# Patient Record
Sex: MALE | Race: BLACK OR AFRICAN AMERICAN | NOT HISPANIC OR LATINO | Employment: OTHER | URBAN - METROPOLITAN AREA
[De-identification: names, ages, dates, MRNs, and addresses within clinical notes are randomized per-mention and may not be internally consistent; named-entity substitution may affect disease eponyms.]

---

## 2018-03-24 ENCOUNTER — HOSPITAL ENCOUNTER (INPATIENT)
Facility: OTHER | Age: 83
LOS: 1 days | Discharge: HOME | DRG: 871 | End: 2018-03-26
Attending: EMERGENCY MEDICINE | Admitting: EMERGENCY MEDICINE
Payer: MEDICARE

## 2018-03-24 DIAGNOSIS — J98.4 PNEUMONITIS: Primary | ICD-10-CM

## 2018-03-24 DIAGNOSIS — R50.9 FEBRILE: ICD-10-CM

## 2018-03-24 DIAGNOSIS — N30.00 ACUTE CYSTITIS WITHOUT HEMATURIA: ICD-10-CM

## 2018-03-24 LAB
ALBUMIN SERPL BCP-MCNC: 4 G/DL
ALP SERPL-CCNC: 59 U/L
ALT SERPL W/O P-5'-P-CCNC: 29 U/L
ANION GAP SERPL CALC-SCNC: 15 MMOL/L
AST SERPL-CCNC: 29 U/L
BASOPHILS # BLD AUTO: 0.01 K/UL
BASOPHILS NFR BLD: 0.1 %
BILIRUB SERPL-MCNC: 0.3 MG/DL
BUN SERPL-MCNC: 18 MG/DL
CALCIUM SERPL-MCNC: 9.2 MG/DL
CHLORIDE SERPL-SCNC: 111 MMOL/L
CO2 SERPL-SCNC: 20 MMOL/L
CREAT SERPL-MCNC: 1 MG/DL
DIFFERENTIAL METHOD: ABNORMAL
EOSINOPHIL # BLD AUTO: 0.1 K/UL
EOSINOPHIL NFR BLD: 0.6 %
ERYTHROCYTE [DISTWIDTH] IN BLOOD BY AUTOMATED COUNT: 16.1 %
EST. GFR  (AFRICAN AMERICAN): >60 ML/MIN/1.73 M^2
EST. GFR  (NON AFRICAN AMERICAN): >60 ML/MIN/1.73 M^2
GLUCOSE SERPL-MCNC: 118 MG/DL
HCT VFR BLD AUTO: 37.7 %
HGB BLD-MCNC: 12.1 G/DL
LACTATE SERPL-SCNC: 2.2 MMOL/L
LYMPHOCYTES # BLD AUTO: 1 K/UL
LYMPHOCYTES NFR BLD: 9.2 %
MCH RBC QN AUTO: 28.6 PG
MCHC RBC AUTO-ENTMCNC: 32.1 G/DL
MCV RBC AUTO: 89 FL
MONOCYTES # BLD AUTO: 0.2 K/UL
MONOCYTES NFR BLD: 1.8 %
NEUTROPHILS # BLD AUTO: 9.4 K/UL
NEUTROPHILS NFR BLD: 88.1 %
PLATELET # BLD AUTO: 172 K/UL
PMV BLD AUTO: 12.1 FL
POTASSIUM SERPL-SCNC: 3.8 MMOL/L
PROT SERPL-MCNC: 7.9 G/DL
RBC # BLD AUTO: 4.23 M/UL
SODIUM SERPL-SCNC: 146 MMOL/L
WBC # BLD AUTO: 10.68 K/UL

## 2018-03-24 PROCEDURE — 85025 COMPLETE CBC W/AUTO DIFF WBC: CPT

## 2018-03-24 PROCEDURE — 25000003 PHARM REV CODE 250: Performed by: EMERGENCY MEDICINE

## 2018-03-24 PROCEDURE — 63600175 PHARM REV CODE 636 W HCPCS: Performed by: EMERGENCY MEDICINE

## 2018-03-24 PROCEDURE — 80053 COMPREHEN METABOLIC PANEL: CPT

## 2018-03-24 PROCEDURE — 99291 CRITICAL CARE FIRST HOUR: CPT | Mod: 25

## 2018-03-24 PROCEDURE — 96361 HYDRATE IV INFUSION ADD-ON: CPT

## 2018-03-24 PROCEDURE — 87077 CULTURE AEROBIC IDENTIFY: CPT

## 2018-03-24 PROCEDURE — 87040 BLOOD CULTURE FOR BACTERIA: CPT | Mod: 59

## 2018-03-24 PROCEDURE — 93005 ELECTROCARDIOGRAM TRACING: CPT

## 2018-03-24 PROCEDURE — 93010 ELECTROCARDIOGRAM REPORT: CPT | Mod: ,,, | Performed by: INTERNAL MEDICINE

## 2018-03-24 PROCEDURE — 87186 SC STD MICRODIL/AGAR DIL: CPT

## 2018-03-24 PROCEDURE — 96365 THER/PROPH/DIAG IV INF INIT: CPT

## 2018-03-24 PROCEDURE — 83605 ASSAY OF LACTIC ACID: CPT

## 2018-03-24 PROCEDURE — 96375 TX/PRO/DX INJ NEW DRUG ADDON: CPT

## 2018-03-24 RX ORDER — ACETAMINOPHEN 500 MG
1000 TABLET ORAL
Status: COMPLETED | OUTPATIENT
Start: 2018-03-24 | End: 2018-03-24

## 2018-03-24 RX ORDER — FINASTERIDE 5 MG/1
5 TABLET, FILM COATED ORAL DAILY
COMMUNITY

## 2018-03-24 RX ADMIN — ACETAMINOPHEN 1000 MG: 500 TABLET ORAL at 09:03

## 2018-03-24 RX ADMIN — AZITHROMYCIN MONOHYDRATE 500 MG: 500 INJECTION, POWDER, LYOPHILIZED, FOR SOLUTION INTRAVENOUS at 11:03

## 2018-03-24 RX ADMIN — CEFTRIAXONE 2 G: 2 INJECTION, SOLUTION INTRAVENOUS at 09:03

## 2018-03-24 RX ADMIN — SODIUM CHLORIDE 2994 ML: 0.9 INJECTION, SOLUTION INTRAVENOUS at 09:03

## 2018-03-25 PROBLEM — J98.4 PNEUMONITIS: Status: ACTIVE | Noted: 2018-03-25

## 2018-03-25 PROBLEM — A41.9 SEPSIS: Status: RESOLVED | Noted: 2018-03-25 | Resolved: 2018-03-25

## 2018-03-25 PROBLEM — I10 ESSENTIAL HYPERTENSION: Status: ACTIVE | Noted: 2018-03-25

## 2018-03-25 PROBLEM — A41.9 SEPSIS: Status: ACTIVE | Noted: 2018-03-25

## 2018-03-25 LAB
BACTERIA #/AREA URNS HPF: ABNORMAL /HPF
BASOPHILS # BLD AUTO: 0.02 K/UL
BASOPHILS NFR BLD: 0.2 %
BILIRUB UR QL STRIP: NEGATIVE
CLARITY UR: ABNORMAL
COLOR UR: YELLOW
DIFFERENTIAL METHOD: ABNORMAL
EOSINOPHIL # BLD AUTO: 0 K/UL
EOSINOPHIL NFR BLD: 0.1 %
ERYTHROCYTE [DISTWIDTH] IN BLOOD BY AUTOMATED COUNT: 16.3 %
FLUAV AG SPEC QL IA: NEGATIVE
FLUBV AG SPEC QL IA: NEGATIVE
GLUCOSE UR QL STRIP: NEGATIVE
HCT VFR BLD AUTO: 35.4 %
HGB BLD-MCNC: 11.5 G/DL
HGB UR QL STRIP: ABNORMAL
HYALINE CASTS #/AREA URNS LPF: 0 /LPF
KETONES UR QL STRIP: NEGATIVE
LACTATE SERPL-SCNC: 0.7 MMOL/L
LEUKOCYTE ESTERASE UR QL STRIP: ABNORMAL
LYMPHOCYTES # BLD AUTO: 1.7 K/UL
LYMPHOCYTES NFR BLD: 14.1 %
MCH RBC QN AUTO: 29.1 PG
MCHC RBC AUTO-ENTMCNC: 32.5 G/DL
MCV RBC AUTO: 90 FL
MICROSCOPIC COMMENT: ABNORMAL
MONOCYTES # BLD AUTO: 0.9 K/UL
MONOCYTES NFR BLD: 6.9 %
NEUTROPHILS # BLD AUTO: 9.7 K/UL
NEUTROPHILS NFR BLD: 78.4 %
NITRITE UR QL STRIP: POSITIVE
PH UR STRIP: 7 [PH] (ref 5–8)
PLATELET # BLD AUTO: 157 K/UL
PMV BLD AUTO: 12 FL
PROT UR QL STRIP: ABNORMAL
RBC # BLD AUTO: 3.95 M/UL
RBC #/AREA URNS HPF: 2 /HPF (ref 0–4)
SP GR UR STRIP: 1.01 (ref 1–1.03)
SPECIMEN SOURCE: NORMAL
SQUAMOUS #/AREA URNS HPF: 0 /HPF
URN SPEC COLLECT METH UR: ABNORMAL
UROBILINOGEN UR STRIP-ACNC: 1 EU/DL
WBC # BLD AUTO: 12.35 K/UL
WBC #/AREA URNS HPF: 8 /HPF (ref 0–5)

## 2018-03-25 PROCEDURE — 63600175 PHARM REV CODE 636 W HCPCS: Performed by: PHYSICIAN ASSISTANT

## 2018-03-25 PROCEDURE — 81000 URINALYSIS NONAUTO W/SCOPE: CPT

## 2018-03-25 PROCEDURE — 87086 URINE CULTURE/COLONY COUNT: CPT

## 2018-03-25 PROCEDURE — 25000003 PHARM REV CODE 250: Performed by: PHYSICIAN ASSISTANT

## 2018-03-25 PROCEDURE — 36415 COLL VENOUS BLD VENIPUNCTURE: CPT

## 2018-03-25 PROCEDURE — 63600175 PHARM REV CODE 636 W HCPCS: Performed by: EMERGENCY MEDICINE

## 2018-03-25 PROCEDURE — 25000003 PHARM REV CODE 250: Performed by: EMERGENCY MEDICINE

## 2018-03-25 PROCEDURE — 94761 N-INVAS EAR/PLS OXIMETRY MLT: CPT

## 2018-03-25 PROCEDURE — 27000221 HC OXYGEN, UP TO 24 HOURS

## 2018-03-25 PROCEDURE — 87400 INFLUENZA A/B EACH AG IA: CPT

## 2018-03-25 PROCEDURE — 11000001 HC ACUTE MED/SURG PRIVATE ROOM

## 2018-03-25 PROCEDURE — 94799 UNLISTED PULMONARY SVC/PX: CPT

## 2018-03-25 PROCEDURE — 85025 COMPLETE CBC W/AUTO DIFF WBC: CPT

## 2018-03-25 PROCEDURE — 83605 ASSAY OF LACTIC ACID: CPT

## 2018-03-25 PROCEDURE — 99223 1ST HOSP IP/OBS HIGH 75: CPT | Mod: ,,, | Performed by: INTERNAL MEDICINE

## 2018-03-25 RX ORDER — DOXAZOSIN 2 MG/1
4 TABLET ORAL DAILY
Status: DISCONTINUED | OUTPATIENT
Start: 2018-03-25 | End: 2018-03-26 | Stop reason: HOSPADM

## 2018-03-25 RX ORDER — ONDANSETRON 8 MG/1
8 TABLET, ORALLY DISINTEGRATING ORAL EVERY 8 HOURS PRN
Status: DISCONTINUED | OUTPATIENT
Start: 2018-03-25 | End: 2018-03-26 | Stop reason: HOSPADM

## 2018-03-25 RX ORDER — GUAIFENESIN 100 MG/5ML
200 SOLUTION ORAL EVERY 4 HOURS PRN
Status: DISCONTINUED | OUTPATIENT
Start: 2018-03-25 | End: 2018-03-26 | Stop reason: HOSPADM

## 2018-03-25 RX ORDER — HYDRALAZINE HYDROCHLORIDE 25 MG/1
25 TABLET, FILM COATED ORAL EVERY 8 HOURS PRN
Status: DISCONTINUED | OUTPATIENT
Start: 2018-03-25 | End: 2018-03-26 | Stop reason: HOSPADM

## 2018-03-25 RX ORDER — FINASTERIDE 5 MG/1
5 TABLET, FILM COATED ORAL DAILY
Status: DISCONTINUED | OUTPATIENT
Start: 2018-03-26 | End: 2018-03-26 | Stop reason: HOSPADM

## 2018-03-25 RX ORDER — TRIAMTERENE AND HYDROCHLOROTHIAZIDE 37.5; 25 MG/1; MG/1
1 CAPSULE ORAL DAILY
Status: DISCONTINUED | OUTPATIENT
Start: 2018-03-25 | End: 2018-03-26 | Stop reason: HOSPADM

## 2018-03-25 RX ORDER — HEPARIN SODIUM 5000 [USP'U]/ML
5000 INJECTION, SOLUTION INTRAVENOUS; SUBCUTANEOUS EVERY 8 HOURS
Status: DISCONTINUED | OUTPATIENT
Start: 2018-03-25 | End: 2018-03-25

## 2018-03-25 RX ORDER — SODIUM CHLORIDE 9 MG/ML
INJECTION, SOLUTION INTRAVENOUS CONTINUOUS
Status: DISCONTINUED | OUTPATIENT
Start: 2018-03-25 | End: 2018-03-25

## 2018-03-25 RX ORDER — ACETAMINOPHEN 325 MG/1
650 TABLET ORAL EVERY 4 HOURS PRN
Status: DISCONTINUED | OUTPATIENT
Start: 2018-03-25 | End: 2018-03-26 | Stop reason: HOSPADM

## 2018-03-25 RX ORDER — RAMELTEON 8 MG/1
8 TABLET ORAL NIGHTLY PRN
Status: DISCONTINUED | OUTPATIENT
Start: 2018-03-25 | End: 2018-03-26 | Stop reason: HOSPADM

## 2018-03-25 RX ORDER — SODIUM CHLORIDE 0.9 % (FLUSH) 0.9 %
3 SYRINGE (ML) INJECTION
Status: DISCONTINUED | OUTPATIENT
Start: 2018-03-25 | End: 2018-03-26 | Stop reason: HOSPADM

## 2018-03-25 RX ORDER — ENOXAPARIN SODIUM 100 MG/ML
40 INJECTION SUBCUTANEOUS EVERY 24 HOURS
Status: DISCONTINUED | OUTPATIENT
Start: 2018-03-25 | End: 2018-03-26 | Stop reason: HOSPADM

## 2018-03-25 RX ADMIN — SODIUM CHLORIDE: 0.9 INJECTION, SOLUTION INTRAVENOUS at 11:03

## 2018-03-25 RX ADMIN — SODIUM CHLORIDE: 0.9 INJECTION, SOLUTION INTRAVENOUS at 04:03

## 2018-03-25 RX ADMIN — AZITHROMYCIN MONOHYDRATE 500 MG: 500 INJECTION, POWDER, LYOPHILIZED, FOR SOLUTION INTRAVENOUS at 10:03

## 2018-03-25 RX ADMIN — VANCOMYCIN HYDROCHLORIDE 1500 MG: 1 INJECTION, POWDER, LYOPHILIZED, FOR SOLUTION INTRAVENOUS at 12:03

## 2018-03-25 RX ADMIN — VANCOMYCIN HYDROCHLORIDE 2000 MG: 1 INJECTION, POWDER, LYOPHILIZED, FOR SOLUTION INTRAVENOUS at 12:03

## 2018-03-25 RX ADMIN — ENOXAPARIN SODIUM 40 MG: 100 INJECTION SUBCUTANEOUS at 04:03

## 2018-03-25 RX ADMIN — TRIAMTERENE AND HYDROCHLOROTHIAZIDE 1 CAPSULE: 25; 37.5 CAPSULE ORAL at 08:03

## 2018-03-25 RX ADMIN — ACETAMINOPHEN 650 MG: 325 TABLET ORAL at 11:03

## 2018-03-25 RX ADMIN — DOXAZOSIN MESYLATE 4 MG: 2 TABLET ORAL at 08:03

## 2018-03-25 NOTE — SUBJECTIVE & OBJECTIVE
Past Medical History:   Diagnosis Date    Arthritis     Hypertension        Past Surgical History:   Procedure Laterality Date    JOINT REPLACEMENT         Review of patient's allergies indicates:   Allergen Reactions    Amlodipine Swelling     Pt stated that he has an reaction 20 years ago       No current facility-administered medications on file prior to encounter.      No current outpatient prescriptions on file prior to encounter.     Family History     None        Social History Main Topics    Smoking status: Former Smoker    Smokeless tobacco: Not on file    Alcohol use No    Drug use: No    Sexual activity: Not on file     Review of Systems   Constitutional: Negative for activity change, appetite change, chills, diaphoresis and fever.   HENT: Negative for congestion, ear pain, rhinorrhea and sore throat.    Respiratory: Negative for cough, shortness of breath and wheezing.    Cardiovascular: Negative for chest pain, palpitations and leg swelling.   Gastrointestinal: Negative for abdominal pain, constipation, diarrhea, nausea and vomiting.   Genitourinary: Negative for decreased urine volume, difficulty urinating, frequency, hematuria and urgency.   Musculoskeletal: Negative for arthralgias, back pain, myalgias and neck pain.   Skin: Negative for color change, pallor and rash.   Neurological: Positive for tremors. Negative for dizziness, seizures, syncope, weakness, numbness and headaches.   Psychiatric/Behavioral: Negative for confusion and decreased concentration.     Objective:     Vital Signs (Most Recent):  Temp: 98.4 °F (36.9 °C) (03/24/18 2314)  Pulse: 70 (03/25/18 0140)  Resp: 16 (03/24/18 2116)  BP: 130/73 (03/25/18 0140)  SpO2: 96 % (03/25/18 0147) Vital Signs (24h Range):  Temp:  [98.4 °F (36.9 °C)-103.1 °F (39.5 °C)] 98.4 °F (36.9 °C)  Pulse:  [] 70  Resp:  [16] 16  SpO2:  [89 %-96 %] 96 %  BP: (115-159)/(59-83) 130/73     Weight: 99.8 kg (220 lb)  Body mass index is 27.5  kg/m².    Physical Exam   Constitutional: He is oriented to person, place, and time. Vital signs are normal. He appears well-developed and well-nourished.  Non-toxic appearance. He does not have a sickly appearance. He does not appear ill. No distress.   HENT:   Head: Normocephalic and atraumatic.   Right Ear: External ear normal.   Left Ear: External ear normal.   Eyes: Conjunctivae and EOM are normal. Pupils are equal, round, and reactive to light. No scleral icterus.   Neck: Normal range of motion. Neck supple. No JVD present. No tracheal deviation present.   Cardiovascular: Normal rate, regular rhythm, normal heart sounds and intact distal pulses.  Exam reveals no gallop and no friction rub.    No murmur heard.  2+ distal radial/DT/PT pulses. No carotid bruits.     Pulmonary/Chest: Effort normal and breath sounds normal. No stridor. No respiratory distress. He has no wheezes. He has no rales.   Abdominal: Soft. Bowel sounds are normal. He exhibits no distension and no mass. There is no tenderness. There is no guarding.   Neurological: He is alert and oriented to person, place, and time.   Skin: Skin is warm and dry. Capillary refill takes less than 2 seconds. He is not diaphoretic.   Psychiatric: He has a normal mood and affect. His behavior is normal. Judgment and thought content normal.   Nursing note and vitals reviewed.        CRANIAL NERVES     CN III, IV, VI   Pupils are equal, round, and reactive to light.  Extraocular motions are normal.        Significant Labs:   BMP:   Recent Labs  Lab 03/24/18  2139   *   *   K 3.8   *   CO2 20*   BUN 18   CREATININE 1.0   CALCIUM 9.2     CBC:   Recent Labs  Lab 03/24/18 2139   WBC 10.68   HGB 12.1*   HCT 37.7*        CMP:   Recent Labs  Lab 03/24/18  2139   *   K 3.8   *   CO2 20*   *   BUN 18   CREATININE 1.0   CALCIUM 9.2   PROT 7.9   ALBUMIN 4.0   BILITOT 0.3   ALKPHOS 59   AST 29   ALT 29   ANIONGAP 15   EGFRNONAA >60      All pertinent labs within the past 24 hours have been reviewed.    Significant Imaging: I have reviewed all pertinent imaging results/findings within the past 24 hours.   Imaging Results          X-Ray Chest AP Portable (Final result)  Result time 03/24/18 23:57:34    Final result by Gray Sanford MD (03/24/18 23:57:34)                 Impression:      Increased attenuation of the pulmonary interstitium.  The findings may represent early pulmonary edema or nonspecific pneumonitis.  No focal consolidation.      Electronically signed by: Gray Sanford MD  Date:    03/24/2018  Time:    23:57             Narrative:    EXAMINATION:  XR CHEST AP PORTABLE    CLINICAL HISTORY:  Sepsis;    TECHNIQUE:  Single frontal view of the chest was performed.    COMPARISON:  None    FINDINGS:  Monitoring EKG leads are present.  The trachea is unremarkable.  There are calcifications of the aortic knob.  The cardiac silhouette is at upper limits of normal.  There is mild elevation of the right hemidiaphragm.  The left hemidiaphragm is unremarkable.  There is no evidence of free air beneath the hemidiaphragms.  There are no pleural effusions.  There is no evidence of a pneumothorax.  There is no evidence of pneumomediastinum.  There is increased attenuation of the pulmonary interstitium.  No focal consolidation is present.  There are degenerative changes in the osseous structures.

## 2018-03-25 NOTE — PLAN OF CARE
Met with patient at bedside to complete discharge planning assessment. Patient lives in [place] but is here on an extended vacation to visit his children. Plans to return to [place] Friday 3/30 via car. Patient's PCP is Dr Welch in [place] & his pharmacy of choice while here is [facility]. Patient is independent of ADLs & denies any anticipated DC needs      03/25/18 0903   Discharge Assessment   Assessment Type Discharge Planning Assessment   Confirmed/corrected address and phone number on facesheet? Yes   Assessment information obtained from? Patient   Communicated expected length of stay with patient/caregiver no   Prior to hospitilization cognitive status: Alert/Oriented   Prior to hospitalization functional status: Independent   Current cognitive status: Alert/Oriented   Current Functional Status: Independent   Lives With child(adolfo), adult   Able to Return to Prior Arrangements yes   Is patient able to care for self after discharge? Yes   Patient's perception of discharge disposition home or selfcare   Readmission Within The Last 30 Days no previous admission in last 30 days   Patient currently being followed by outpatient case management? No   Patient currently receives any other outside agency services? No   Equipment Currently Used at Home none   Do you have any problems affording any of your prescribed medications? No   Is the patient taking medications as prescribed? yes   Does the patient have transportation home? Yes   Transportation Available family or friend will provide   Does the patient receive services at the [facility]? No   Discharge Plan A Home with family   Patient/Family In Agreement With Plan yes

## 2018-03-25 NOTE — HPI
Mr. Jonathan Flannery Jr. is a 82 y.o. Male, with a history HTN, who presents with complaint of lightheadedness that began about two hours ago. He reports feeling well earlier in the day. He reports fever that developed about two hours ago. He reports chronic productive cough with no recent changes. He denies congestion, sore throat, shortness of breath, abdominal pain, dysuria, or urinary frequency. He denies any known sick contacts. He denies tobacco use.     The patient was evaluated in the ED and diagnosed with pneumonitis via CXR. He did meet sepsis criteria, and was admitted for IV antibiotics and close monitoring in the ICU.

## 2018-03-25 NOTE — PLAN OF CARE
Problem: Patient Care Overview  Goal: Plan of Care Review  Outcome: Ongoing (interventions implemented as appropriate)  Pt in chair for most of day. Afebrile. VSS. Transferred to  309 with family at chairside. C/o of should pain x1 early this am. Relieved with PO tylenol. Uneventful day.

## 2018-03-25 NOTE — ED PROVIDER NOTES
Encounter Date: 3/24/2018    SCRIBE #1 NOTE: I, Yarelis Soriano, am scribing for, and in the presence of, Dr. Mendieta.       History     Chief Complaint   Patient presents with    Dizziness     Time seen by provider: 9:47 PM    This is a 82 y.o. Male, with a history HTN, who presents with complaint of lightheadedness that began about two hours ago. He reports feeling well earlier in the day. He reports fever that developed about two hours ago. He reports chronic productive cough with no recent changes. He denies congestion, sore throat, shortness of breath, abdominal pain, dysuria, or urinary frequency. He denies any known sick contacts. He denies tobacco use.       The history is provided by the patient.     Review of patient's allergies indicates:  No Known Allergies  Past Medical History:   Diagnosis Date    Arthritis     Hypertension      Past Surgical History:   Procedure Laterality Date    JOINT REPLACEMENT       History reviewed. No pertinent family history.  Social History   Substance Use Topics    Smoking status: Former Smoker    Smokeless tobacco: Not on file    Alcohol use No     Review of Systems   Constitutional: Positive for fever.   HENT: Negative for congestion and sore throat.    Respiratory: Positive for cough (chronic). Negative for shortness of breath.    Cardiovascular: Negative for chest pain.   Gastrointestinal: Negative for abdominal pain, nausea and vomiting.   Genitourinary: Negative for dysuria, frequency and urgency.   Musculoskeletal: Negative for back pain.   Skin: Negative for rash.   Neurological: Positive for light-headedness. Negative for weakness.   Hematological: Does not bruise/bleed easily.       Physical Exam     Initial Vitals [03/24/18 2116]   BP Pulse Resp Temp SpO2   (!) 159/69 (!) 123 16 (!) 103.1 °F (39.5 °C) (!) 89 %      MAP       99         Physical Exam    Nursing note and vitals reviewed.  Constitutional: He appears well-developed and well-nourished. He is not  diaphoretic. No distress.   HENT:   Head: Normocephalic and atraumatic.   Right Ear: External ear normal.   Left Ear: External ear normal.   Eyes: EOM are normal. Right eye exhibits no discharge. Left eye exhibits no discharge.   Neck: Normal range of motion.   Cardiovascular: Regular rhythm and normal heart sounds. Tachycardia present.  Exam reveals no gallop and no friction rub.    No murmur heard.  2+ distal radius pulses.    Pulmonary/Chest: Breath sounds normal. No respiratory distress. He has no wheezes. He has no rhonchi. He has no rales.   Abdominal: Soft. There is no tenderness. There is no rebound and no guarding.   Musculoskeletal: Normal range of motion. He exhibits no edema or tenderness.   Neurological: He is alert and oriented to person, place, and time.   Skin: Skin is warm and dry. No rash and no abscess noted. No erythema. No pallor.   Skin warm to touch. No rash or lesions.    Psychiatric: He has a normal mood and affect. His behavior is normal. Judgment and thought content normal.         ED Course   Critical Care  Date/Time: 3/25/2018 4:17 AM  Performed by: TRISH VALDEZ.  Authorized by: TRISH VALDEZ   Direct patient critical care time: 20 minutes  Additional history critical care time: 5 minutes  Ordering / reviewing critical care time: 5 minutes  Documentation critical care time: 5 minutes  Other critical care time: 5 minutes  Total critical care time (exclusive of procedural time) : 40 minutes  Critical care time was exclusive of separately billable procedures and treating other patients and teaching time.  Critical care was necessary to treat or prevent imminent or life-threatening deterioration of the following conditions: sepsis.        Labs Reviewed   CBC W/ AUTO DIFFERENTIAL - Abnormal; Notable for the following:        Result Value    RBC 4.23 (*)     Hemoglobin 12.1 (*)     Hematocrit 37.7 (*)     RDW 16.1 (*)     Gran # (ANC) 9.4 (*)     Mono # 0.2 (*)     Gran% 88.1 (*)      Lymph% 9.2 (*)     Mono% 1.8 (*)     All other components within normal limits   COMPREHENSIVE METABOLIC PANEL - Abnormal; Notable for the following:     Sodium 146 (*)     Chloride 111 (*)     CO2 20 (*)     Glucose 118 (*)     All other components within normal limits   CULTURE, BLOOD   CULTURE, BLOOD   CULTURE, URINE   LACTIC ACID, PLASMA   INFLUENZA A AND B ANTIGEN   URINALYSIS     Imaging Results          X-Ray Chest AP Portable (Final result)  Result time 03/24/18 23:57:34    Final result by Gray Sanford MD (03/24/18 23:57:34)                 Impression:      Increased attenuation of the pulmonary interstitium.  The findings may represent early pulmonary edema or nonspecific pneumonitis.  No focal consolidation.      Electronically signed by: Gray Sanford MD  Date:    03/24/2018  Time:    23:57             Narrative:    EXAMINATION:  XR CHEST AP PORTABLE    CLINICAL HISTORY:  Sepsis;    TECHNIQUE:  Single frontal view of the chest was performed.    COMPARISON:  None    FINDINGS:  Monitoring EKG leads are present.  The trachea is unremarkable.  There are calcifications of the aortic knob.  The cardiac silhouette is at upper limits of normal.  There is mild elevation of the right hemidiaphragm.  The left hemidiaphragm is unremarkable.  There is no evidence of free air beneath the hemidiaphragms.  There are no pleural effusions.  There is no evidence of a pneumothorax.  There is no evidence of pneumomediastinum.  There is increased attenuation of the pulmonary interstitium.  No focal consolidation is present.  There are degenerative changes in the osseous structures.                                     X-Rays:   Independently Interpreted Readings:   Chest X-Ray: Chronic changes. No definite opacities.        Medical Decision Making:   Independently Interpreted Test(s):   I have ordered and independently interpreted X-rays - see prior notes.  I have ordered and independently interpreted EKG Reading(s) - see prior  "notes  Clinical Tests:   Lab Tests: Ordered and Reviewed  Radiological Study: Ordered and Reviewed  Medical Tests: Ordered and Reviewed  ED Management:  Elderly gentleman presents with vital signs concerning for sepsis.  He is febrile tachycardic and hypoxic.  His only symptom is "shakes ".  This started just tonight.  Usual state of health earlier today.  Surprisingly healthy for his age, only hypertension and BPH.  X-ray reveals nonspecific pneumonitis no eyes pneumonia.  I have begun antibiotics per the sepsis protocol.  Lactate is been borderline at 2.2.  Repeat lactate much improved at 0.7.  No leukocytosis.  Given his aunts age and concerning presentation, I do think he warrants overnight hospitalization.  Per our hospital protocol for sepsis so we placed in ICU.  At time of admission his urinalysis does return, concerning for infection nitrate positive.  The pneumonia antibiotics and with the ceftriaxone should be adequate for this.              Attending Attestation:           Physician Attestation for Scribe:  Physician Attestation Statement for Scribe #1: I, Dr. Mendieta, reviewed documentation, as scribed by Yarelis Soriano in my presence, and it is both accurate and complete.                    Clinical Impression:     1. Pneumonitis    2. Febrile    3. Acute cystitis without hematuria                                 Ha Mendieta MD  03/25/18 0417    "

## 2018-03-25 NOTE — H&P
[facility] Hospital Medicine  History & Physical    Patient Name: Jonathan Flannery Jr.  MRN: [id number]  Admission Date: 3/24/2018  Attending Physician: Sangeetha Knott, *   Primary Care Provider: No primary care provider on file.         Patient information was obtained from patient, past medical records and ER records.     Subjective:     Principal Problem:Sepsis    Chief Complaint:   Chief Complaint   Patient presents with    Dizziness        HPI: Mr. Jonathan Flannery Jr. is a 82 y.o. Male, with a history HTN, who presents with complaint of lightheadedness that began about two hours ago. He reports feeling well earlier in the day. He reports fever that developed about two hours ago. He reports chronic productive cough with no recent changes. He denies congestion, sore throat, shortness of breath, abdominal pain, dysuria, or urinary frequency. He denies any known sick contacts. He denies tobacco use.     The patient was evaluated in the ED and diagnosed with pneumonitis via CXR. He did meet sepsis criteria, and was admitted for IV antibiotics and close monitoring in the ICU.     Past Medical History:   Diagnosis Date    Arthritis     Hypertension        Past Surgical History:   Procedure Laterality Date    JOINT REPLACEMENT         Review of patient's allergies indicates:   Allergen Reactions    Amlodipine Swelling     Pt stated that he has an reaction 20 years ago       No current facility-administered medications on file prior to encounter.      No current outpatient prescriptions on file prior to encounter.     Family History     None        Social History Main Topics    Smoking status: Former Smoker    Smokeless tobacco: Not on file    Alcohol use No    Drug use: No    Sexual activity: Not on file     Review of Systems   Constitutional: Negative for activity change, appetite change, chills, diaphoresis and fever.   HENT: Negative for congestion, ear pain, rhinorrhea and sore  throat.    Respiratory: Negative for cough, shortness of breath and wheezing.    Cardiovascular: Negative for chest pain, palpitations and leg swelling.   Gastrointestinal: Negative for abdominal pain, constipation, diarrhea, nausea and vomiting.   Genitourinary: Negative for decreased urine volume, difficulty urinating, frequency, hematuria and urgency.   Musculoskeletal: Negative for arthralgias, back pain, myalgias and neck pain.   Skin: Negative for color change, pallor and rash.   Neurological: Positive for tremors. Negative for dizziness, seizures, syncope, weakness, numbness and headaches.   Psychiatric/Behavioral: Negative for confusion and decreased concentration.     Objective:     Vital Signs (Most Recent):  Temp: 98.4 °F (36.9 °C) (03/24/18 2314)  Pulse: 70 (03/25/18 0140)  Resp: 16 (03/24/18 2116)  BP: 130/73 (03/25/18 0140)  SpO2: 96 % (03/25/18 0147) Vital Signs (24h Range):  Temp:  [98.4 °F (36.9 °C)-103.1 °F (39.5 °C)] 98.4 °F (36.9 °C)  Pulse:  [] 70  Resp:  [16] 16  SpO2:  [89 %-96 %] 96 %  BP: (115-159)/(59-83) 130/73     Weight: 99.8 kg (220 lb)  Body mass index is 27.5 kg/m².    Physical Exam   Constitutional: He is oriented to person, place, and time. Vital signs are normal. He appears well-developed and well-nourished.  Non-toxic appearance. He does not have a sickly appearance. He does not appear ill. No distress.   HENT:   Head: Normocephalic and atraumatic.   Right Ear: External ear normal.   Left Ear: External ear normal.   Eyes: Conjunctivae and EOM are normal. Pupils are equal, round, and reactive to light. No scleral icterus.   Neck: Normal range of motion. Neck supple. No JVD present. No tracheal deviation present.   Cardiovascular: Normal rate, regular rhythm, normal heart sounds and intact distal pulses.  Exam reveals no gallop and no friction rub.    No murmur heard.  2+ distal radial/DT/PT pulses. No carotid bruits.     Pulmonary/Chest: Effort normal and breath sounds  normal. No stridor. No respiratory distress. He has no wheezes. He has no rales.   Abdominal: Soft. Bowel sounds are normal. He exhibits no distension and no mass. There is no tenderness. There is no guarding.   Neurological: He is alert and oriented to person, place, and time.   Skin: Skin is warm and dry. Capillary refill takes less than 2 seconds. He is not diaphoretic.   Psychiatric: He has a normal mood and affect. His behavior is normal. Judgment and thought content normal.   Nursing note and vitals reviewed.        CRANIAL NERVES     CN III, IV, VI   Pupils are equal, round, and reactive to light.  Extraocular motions are normal.        Significant Labs:   BMP:   Recent Labs  Lab 03/24/18 2139   *   *   K 3.8   *   CO2 20*   BUN 18   CREATININE 1.0   CALCIUM 9.2     CBC:   Recent Labs  Lab 03/24/18 2139   WBC 10.68   HGB 12.1*   HCT 37.7*        CMP:   Recent Labs  Lab 03/24/18 2139   *   K 3.8   *   CO2 20*   *   BUN 18   CREATININE 1.0   CALCIUM 9.2   PROT 7.9   ALBUMIN 4.0   BILITOT 0.3   ALKPHOS 59   AST 29   ALT 29   ANIONGAP 15   EGFRNONAA >60     All pertinent labs within the past 24 hours have been reviewed.    Significant Imaging: I have reviewed all pertinent imaging results/findings within the past 24 hours.   Imaging Results          X-Ray Chest AP Portable (Final result)  Result time 03/24/18 23:57:34    Final result by Gray Sanford MD (03/24/18 23:57:34)                 Impression:      Increased attenuation of the pulmonary interstitium.  The findings may represent early pulmonary edema or nonspecific pneumonitis.  No focal consolidation.      Electronically signed by: Gray Sanford MD  Date:    03/24/2018  Time:    23:57             Narrative:    EXAMINATION:  XR CHEST AP PORTABLE    CLINICAL HISTORY:  Sepsis;    TECHNIQUE:  Single frontal view of the chest was performed.    COMPARISON:  None    FINDINGS:  Monitoring EKG leads are present.  The  trachea is unremarkable.  There are calcifications of the aortic knob.  The cardiac silhouette is at upper limits of normal.  There is mild elevation of the right hemidiaphragm.  The left hemidiaphragm is unremarkable.  There is no evidence of free air beneath the hemidiaphragms.  There are no pleural effusions.  There is no evidence of a pneumothorax.  There is no evidence of pneumomediastinum.  There is increased attenuation of the pulmonary interstitium.  No focal consolidation is present.  There are degenerative changes in the osseous structures.                                  Assessment/Plan:     * Sepsis    - sepsis protocol initiated in ED   - continue IV fluids   - serial lactates (initial not elevated)           Pneumonitis    - continue Vanc & azithromycin   - PRN cough meds   - PRN tylenol for fever   - incentive spirometer         Essential hypertension    - continue home meds  - monitor             VTE Risk Mitigation         Ordered     enoxaparin injection 40 mg  Daily     Route:  Subcutaneous        03/25/18 0456     Place sequential compression device  Until discontinued      03/25/18 0456             Angela Kelly PA-C  Department of Hospital Medicine   [facility]

## 2018-03-25 NOTE — ED TRIAGE NOTES
Pt presents to the ED c/o trembling hands. Pt stated that sx started around 7:30 pm tonight. Pt stated that he never felt this way before. Pt denies sob, dizziness or pain.

## 2018-03-25 NOTE — ASSESSMENT & PLAN NOTE
- sepsis protocol initiated in ED   - continue IV fluids   - serial lactates (initial not elevated)

## 2018-03-25 NOTE — PLAN OF CARE
Problem: Patient Care Overview  Goal: Plan of Care Review  Outcome: Ongoing (interventions implemented as appropriate)  Patient in no apparent distress. Sat's 97  % on 2 lpm. IS done . Will continue to monitor.

## 2018-03-25 NOTE — ED NOTES
Pt transported to ICU bed 2 from ED bed 1 by Inés WALKER and Rhoda RN. Pt was transported connected zoll and IV fluids per MAR. Pt remained stable. ICU nurse Andrea met pt at bedside. Sepsis sheet handed to ICU nurse.

## 2018-03-25 NOTE — NURSING
Pt received from ICU AAOx4, vital signs stable except for BP slightly elevate.  Breathing unlabored on room air.  Pt and family oriented to room.  Home medications verified with medications ordered while admitted in hospital at pt's request.  Upon rechecking Bp, found to be 148/76.  Tele monitor in place according to orders.  Will continue to monitor and will report to oncoming RN.

## 2018-03-25 NOTE — ASSESSMENT & PLAN NOTE
- continue Vanc & azithromycin   - PRN cough meds   - PRN tylenol for fever   - incentive spirometer

## 2018-03-26 VITALS
DIASTOLIC BLOOD PRESSURE: 72 MMHG | WEIGHT: 220 LBS | TEMPERATURE: 99 F | HEART RATE: 81 BPM | OXYGEN SATURATION: 97 % | RESPIRATION RATE: 18 BRPM | SYSTOLIC BLOOD PRESSURE: 149 MMHG | BODY MASS INDEX: 27.35 KG/M2 | HEIGHT: 75 IN

## 2018-03-26 PROBLEM — N30.00 ACUTE CYSTITIS WITHOUT HEMATURIA: Status: ACTIVE | Noted: 2018-03-26

## 2018-03-26 LAB
ANION GAP SERPL CALC-SCNC: 11 MMOL/L
BACTERIA UR CULT: NORMAL
BUN SERPL-MCNC: 12 MG/DL
CALCIUM SERPL-MCNC: 9.1 MG/DL
CHLORIDE SERPL-SCNC: 106 MMOL/L
CO2 SERPL-SCNC: 22 MMOL/L
CREAT SERPL-MCNC: 0.9 MG/DL
ERYTHROCYTE [DISTWIDTH] IN BLOOD BY AUTOMATED COUNT: 16.3 %
EST. GFR  (AFRICAN AMERICAN): >60 ML/MIN/1.73 M^2
EST. GFR  (NON AFRICAN AMERICAN): >60 ML/MIN/1.73 M^2
GLUCOSE SERPL-MCNC: 73 MG/DL
HCT VFR BLD AUTO: 35 %
HGB BLD-MCNC: 11.3 G/DL
MAGNESIUM SERPL-MCNC: 1.9 MG/DL
MCH RBC QN AUTO: 28.5 PG
MCHC RBC AUTO-ENTMCNC: 32.3 G/DL
MCV RBC AUTO: 88 FL
PHOSPHATE SERPL-MCNC: 3 MG/DL
PLATELET # BLD AUTO: ABNORMAL K/UL
PMV BLD AUTO: ABNORMAL FL
POTASSIUM SERPL-SCNC: 3.2 MMOL/L
RBC # BLD AUTO: 3.97 M/UL
SODIUM SERPL-SCNC: 139 MMOL/L
WBC # BLD AUTO: 11.71 K/UL

## 2018-03-26 PROCEDURE — 80048 BASIC METABOLIC PNL TOTAL CA: CPT

## 2018-03-26 PROCEDURE — 25000003 PHARM REV CODE 250: Performed by: PHYSICIAN ASSISTANT

## 2018-03-26 PROCEDURE — 94761 N-INVAS EAR/PLS OXIMETRY MLT: CPT

## 2018-03-26 PROCEDURE — 36415 COLL VENOUS BLD VENIPUNCTURE: CPT

## 2018-03-26 PROCEDURE — 83735 ASSAY OF MAGNESIUM: CPT

## 2018-03-26 PROCEDURE — 85027 COMPLETE CBC AUTOMATED: CPT

## 2018-03-26 PROCEDURE — 94799 UNLISTED PULMONARY SVC/PX: CPT

## 2018-03-26 PROCEDURE — 84100 ASSAY OF PHOSPHORUS: CPT

## 2018-03-26 PROCEDURE — 25000003 PHARM REV CODE 250: Performed by: INTERNAL MEDICINE

## 2018-03-26 PROCEDURE — 99239 HOSP IP/OBS DSCHRG MGMT >30: CPT | Mod: ,,, | Performed by: INTERNAL MEDICINE

## 2018-03-26 PROCEDURE — 63600175 PHARM REV CODE 636 W HCPCS: Performed by: INTERNAL MEDICINE

## 2018-03-26 RX ORDER — CEFUROXIME AXETIL 500 MG/1
500 TABLET ORAL 2 TIMES DAILY
Qty: 10 TABLET | Refills: 0 | Status: SHIPPED | OUTPATIENT
Start: 2018-03-26 | End: 2018-03-31

## 2018-03-26 RX ORDER — AZITHROMYCIN 500 MG/1
500 TABLET, FILM COATED ORAL DAILY
Qty: 5 TABLET | Refills: 0 | Status: SHIPPED | OUTPATIENT
Start: 2018-03-26 | End: 2018-03-31

## 2018-03-26 RX ORDER — DOXAZOSIN 4 MG/1
4 TABLET ORAL DAILY
Qty: 30 TABLET | Refills: 1 | Status: SHIPPED | OUTPATIENT
Start: 2018-03-27 | End: 2018-05-26

## 2018-03-26 RX ORDER — TRIAMTERENE AND HYDROCHLOROTHIAZIDE 37.5; 25 MG/1; MG/1
1 CAPSULE ORAL DAILY
Qty: 30 CAPSULE | Refills: 1 | Status: SHIPPED | OUTPATIENT
Start: 2018-03-27 | End: 2018-05-26

## 2018-03-26 RX ADMIN — TRIAMTERENE AND HYDROCHLOROTHIAZIDE 1 CAPSULE: 25; 37.5 CAPSULE ORAL at 09:03

## 2018-03-26 RX ADMIN — DOXAZOSIN MESYLATE 4 MG: 2 TABLET ORAL at 09:03

## 2018-03-26 RX ADMIN — FINASTERIDE 5 MG: 5 TABLET, FILM COATED ORAL at 09:03

## 2018-03-26 RX ADMIN — CEFTRIAXONE 1 G: 1 INJECTION, SOLUTION INTRAVENOUS at 12:03

## 2018-03-26 RX ADMIN — ACETAMINOPHEN 650 MG: 325 TABLET ORAL at 12:03

## 2018-03-26 NOTE — PROGRESS NOTES
Patient given discharge instructions, all questions answered.  IV removed and dressed with coban and gauze.  Telemetry monitor removed and returned.  New medications and follow up instructions reviewed with patient.  Prescriptions sent to [facility] per MD.  Side effects of medications discussed with patient.  Patient and family verbalized understanding.  Wheelchair transport requested for patient.

## 2018-03-26 NOTE — PLAN OF CARE
03/26/18 1514   Final Note   Assessment Type Final Discharge Note   Discharge Disposition Home   Hospital Follow Up  Appt(s) scheduled? Yes   Discharge plans and expectations educations in teach back method with documentation complete? Yes   Right Care Referral Info   Post Acute Recommendation No Care   Referral Type see AVS (return to [place]

## 2018-03-26 NOTE — PLAN OF CARE
Problem: Patient Care Overview  Goal: Plan of Care Review  Outcome: Ongoing (interventions implemented as appropriate)  Plan of care reviewed with Pt. Purposeful hourly rounding performed. VSS, except temperature. Temp controlled with PRN medication. Abx as ordered. Pt ambulates independently. No c/o at this time. Bed locked and lowered, call light in reach. Will continue to monitor.

## 2018-03-26 NOTE — PLAN OF CARE
Problem: Patient Care Overview  Goal: Plan of Care Review  Outcome: Ongoing (interventions implemented as appropriate)  Pt remains on RA. IS done. No distress noted.

## 2018-03-26 NOTE — PLAN OF CARE
Problem: Patient Care Overview  Goal: Plan of Care Review  Outcome: Ongoing (interventions implemented as appropriate)  Plan of care reviewed with patient and family.  No acute events since transfer from ICU this afternoon.  No complaints of pain.  Report given to oncoming Rn.

## 2018-03-28 LAB
BACTERIA BLD CULT: NORMAL

## 2018-03-30 LAB — BACTERIA BLD CULT: NORMAL

## 2018-04-13 NOTE — DISCHARGE SUMMARY
[facility] Hospital Medicine  Discharge Summary      Patient Name: Jonathan Flannery Jr.  MRN: [id number]  Admission Date: 3/24/2018  Hospital Length of Stay: 1 days  Discharge Date and Time: 3/26/2018  3:51 PM  Attending Physician: No att. providers found   Discharging Provider: Sangeetha Knott MD  Primary Care Provider: Walter Welch DO, DO      HPI:   Mr. Jonathan Flannery Jr. is a 82 y.o. Male, with a history HTN, who presents with complaint of lightheadedness that began about two hours ago. He reports feeling well earlier in the day. He reports fever that developed about two hours ago. He reports chronic productive cough with no recent changes. He denies congestion, sore throat, shortness of breath, abdominal pain, dysuria, or urinary frequency. He denies any known sick contacts. He denies tobacco use.     The patient was evaluated in the ED and diagnosed with pneumonitis via CXR. He did meet sepsis criteria, and was admitted for IV antibiotics and close monitoring in the ICU.     Hospital Course:   Patient observed overnight and all symptoms quickly resolved. Patient found to have pneumonitis and a UTI. Blood cultures, Urine cultures NGTD at time of discharge.  Patient discharged with oral azithromycin and cefuroxime as he recovered rapidly on IV azithromycin and Rocephin.  Patient will follow up with PCP in [place] within one week.  Patient and granddaughter in agreement with plan at time of discharge.     Consults:     No new Assessment & Plan notes have been filed under this hospital service since the last note was generated.  Service: Hospital Medicine    Final Active Diagnoses:    Diagnosis Date Noted POA    Acute cystitis without hematuria [N30.00] 03/26/2018 Yes    Pneumonitis [J18.9] 03/25/2018 Yes    Essential hypertension [I10] 03/25/2018 Yes      Problems Resolved During this Admission:    Diagnosis Date Noted Date Resolved POA    PRINCIPAL PROBLEM:  Sepsis [A41.9]  03/25/2018 03/25/2018 Yes       Discharged Condition: stable    Disposition: Home or Self Care    Follow Up:  Follow-up Information     Walter Welch DO In 1 week.    Why:  For follow up after hospital discharge  Contact information:  [address] [place] [address] [phone]                 Patient Instructions:     Diet Adult Regular   Order Specific Question Answer Comments   Additional restrictions: Low Sodium,2gm      Activity as tolerated         Significant Diagnostic Studies: Labs: All labs within the past 24 hours have been reviewed    Pending Diagnostic Studies:     None         Medications:  Reconciled Home Medications:      Medication List      START taking these medications    doxazosin 4 MG tablet  Commonly known as:  CARDURA  Take 1 tablet (4 mg total) by mouth once daily.     triamterene-hydrochlorothiazide 37.5-25 mg 37.5-25 mg per capsule  Commonly known as:  DYAZIDE  Take 1 capsule by mouth once daily.        CONTINUE taking these medications    finasteride 5 mg tablet  Commonly known as:  PROSCAR  Take 5 mg by mouth once daily.        ASK your doctor about these medications    azithromycin 500 MG tablet  Commonly known as:  ZITHROMAX  Take 1 tablet (500 mg total) by mouth once daily.  Ask about: Should I take this medication?     cefUROXime 500 MG tablet  Commonly known as:  CEFTIN  Take 1 tablet (500 mg total) by mouth 2 (two) times daily.  Ask about: Should I take this medication?            Indwelling Lines/Drains at time of discharge:   Lines/Drains/Airways          No matching active lines, drains, or airways          Time spent on the discharge of patient: >30 minutes  Patient was seen and examined on the date of discharge and determined to be suitable for discharge.         Sangeetha Knott MD  Department of Hospital Medicine  [facility]